# Patient Record
Sex: FEMALE | Race: BLACK OR AFRICAN AMERICAN | NOT HISPANIC OR LATINO | Employment: UNEMPLOYED | URBAN - METROPOLITAN AREA
[De-identification: names, ages, dates, MRNs, and addresses within clinical notes are randomized per-mention and may not be internally consistent; named-entity substitution may affect disease eponyms.]

---

## 2017-12-12 PROBLEM — Z86.19 H/O SYPHILIS: Chronic | Status: ACTIVE | Noted: 2017-12-12

## 2018-02-16 ENCOUNTER — HOSPITAL ENCOUNTER (EMERGENCY)
Facility: OTHER | Age: 28
Discharge: HOME | End: 2018-02-16
Attending: EMERGENCY MEDICINE
Payer: MEDICAID

## 2018-02-16 VITALS
BODY MASS INDEX: 33.99 KG/M2 | OXYGEN SATURATION: 100 % | HEART RATE: 66 BPM | DIASTOLIC BLOOD PRESSURE: 82 MMHG | TEMPERATURE: 98 F | SYSTOLIC BLOOD PRESSURE: 132 MMHG | WEIGHT: 180 LBS | RESPIRATION RATE: 16 BRPM | HEIGHT: 61 IN

## 2018-02-16 DIAGNOSIS — B37.31 CANDIDA VAGINITIS: Primary | ICD-10-CM

## 2018-02-16 DIAGNOSIS — B35.4 TINEA CORPORIS: ICD-10-CM

## 2018-02-16 LAB
B-HCG UR QL: NEGATIVE
BILIRUB UR QL STRIP: NEGATIVE
CLARITY UR: ABNORMAL
COLOR UR: YELLOW
CTP QC/QA: YES
GLUCOSE UR QL STRIP: NEGATIVE
HGB UR QL STRIP: NEGATIVE
KETONES UR QL STRIP: NEGATIVE
LEUKOCYTE ESTERASE UR QL STRIP: NEGATIVE
NITRITE UR QL STRIP: NEGATIVE
PH UR STRIP: 6 [PH] (ref 5–8)
PROT UR QL STRIP: NEGATIVE
SP GR UR STRIP: >=1.03 (ref 1–1.03)
URN SPEC COLLECT METH UR: ABNORMAL
UROBILINOGEN UR STRIP-ACNC: 1 EU/DL

## 2018-02-16 PROCEDURE — 81025 URINE PREGNANCY TEST: CPT | Performed by: EMERGENCY MEDICINE

## 2018-02-16 PROCEDURE — 99283 EMERGENCY DEPT VISIT LOW MDM: CPT | Mod: 25

## 2018-02-16 PROCEDURE — 81003 URINALYSIS AUTO W/O SCOPE: CPT

## 2018-02-16 PROCEDURE — 25000003 PHARM REV CODE 250: Performed by: PHYSICIAN ASSISTANT

## 2018-02-16 RX ORDER — FLUCONAZOLE 150 MG/1
150 TABLET ORAL ONCE
Status: COMPLETED | OUTPATIENT
Start: 2018-02-16 | End: 2018-02-16

## 2018-02-16 RX ORDER — FLUCONAZOLE 150 MG/1
150 TABLET ORAL DAILY
Qty: 1 TABLET | Refills: 0 | Status: SHIPPED | OUTPATIENT
Start: 2018-02-23 | End: 2018-02-24

## 2018-02-16 RX ORDER — FLUCONAZOLE 150 MG/1
150 TABLET ORAL DAILY
Qty: 1 TABLET | Refills: 0 | Status: SHIPPED | OUTPATIENT
Start: 2018-02-23 | End: 2018-02-16

## 2018-02-16 RX ORDER — KETOCONAZOLE 20 MG/G
CREAM TOPICAL DAILY
Qty: 15 G | Refills: 0 | Status: SHIPPED | OUTPATIENT
Start: 2018-02-16

## 2018-02-16 RX ADMIN — FLUCONAZOLE 150 MG: 150 TABLET ORAL at 09:02

## 2018-02-17 NOTE — ED PROVIDER NOTES
"Encounter Date: 2/16/2018       History     Chief Complaint   Patient presents with    Vaginitis     Patient c/o having a yeast infection for 1 week.  Patient took Monistat at home with no relief.  Patient c/o a rash to bilateral arms and legs.  Patient c/o right hand pain, non traumatic.      Patient is a 28-year-old female with no pertinent past medical history who presents to the ED with multiple complaints.  Patient reports a rash to bilateral upper and lower extremities. Patient states she first noticed the rash on her right upper arm.  She states that then spread diffusely to her left upper arm into her bilateral legs.  She states it is pruritic.  She denies any new lotions, medications, or new environmental exposure.  Patient also reports vaginal discharge for over a week.  She states she experienced this before when she had a yeast infection.  She states one week ago she took Monistat and her symptoms went away.  She states her symptoms returned about 4 days ago.  She reports she has white discharge that "looks like mashed potatoes".  She denies fever or chills.  She denies at all pain, nausea, or vomiting.      The history is provided by the patient.     Review of patient's allergies indicates:  No Known Allergies  History reviewed. No pertinent past medical history.  History reviewed. No pertinent surgical history.  Family History   Problem Relation Age of Onset    Breast cancer Neg Hx     Colon cancer Neg Hx     Ovarian cancer Neg Hx      Social History   Substance Use Topics    Smoking status: Former Smoker    Smokeless tobacco: Not on file    Alcohol use No     Review of Systems   Constitutional: Negative for chills and fever.   HENT: Negative for congestion and sore throat.    Eyes: Negative for pain.   Respiratory: Negative for shortness of breath.    Cardiovascular: Negative for chest pain.   Gastrointestinal: Negative for abdominal pain, diarrhea, nausea and vomiting.   Genitourinary: " Positive for vaginal discharge. Negative for dysuria, hematuria, vaginal bleeding and vaginal pain.   Musculoskeletal: Negative for arthralgias.   Skin: Positive for rash.   Neurological: Negative for headaches.       Physical Exam     Initial Vitals [02/16/18 1959]   BP Pulse Resp Temp SpO2   122/60 71 16 98.2 °F (36.8 °C) 97 %      MAP       80.67         Physical Exam    Constitutional: Vital signs are normal. She is cooperative.   HENT:   Head: Normocephalic and atraumatic.   Mouth/Throat: Oropharynx is clear and moist.   Eyes: Conjunctivae and EOM are normal. Pupils are equal, round, and reactive to light.   Neck: Normal range of motion. Neck supple.   Cardiovascular: Normal rate, regular rhythm and intact distal pulses.   No murmur heard.  Pulmonary/Chest: Breath sounds normal. No respiratory distress. She has no wheezes. She has no rhonchi. She has no rales.   Abdominal: Soft. Bowel sounds are normal. There is no tenderness. There is no rebound and no guarding.   Neurological: She is alert and oriented to person, place, and time. She has normal strength. GCS eye subscore is 4. GCS verbal subscore is 5. GCS motor subscore is 6.   Skin: Skin is warm and dry. Capillary refill takes less than 2 seconds.   Annular, erythematous raised areas to the extremities (more prominent in the right lower extremity (no pustules or vesicles.  No drainage.    Psychiatric: She has a normal mood and affect. Her behavior is normal.         ED Course   Procedures  Labs Reviewed   URINALYSIS - Abnormal; Notable for the following:        Result Value    Appearance, UA Hazy (*)     Specific Gravity, UA >=1.030 (*)     All other components within normal limits   POCT URINE PREGNANCY             Medical Decision Making:   Initial Assessment:   Urgent evaluation of a 28 y.o. female presenting to the emergency department complaining of vaginal discharge and a rash. Patient is afebrile, nontoxic appearing and hemodynamically stable.  ED  Management:  Patient with complaints of rash and vaginal discharge.  Rash does not look infected.  No signs of cellulitis.  No signs of SJS. Rash more consistent with a fungal, ringworm type rash.  She also has vaginal discharge that is described as a yeast infection.  Patient would like medication for this.  She denies vaginal pain, abdominal pain, nausea or vomiting.  Will treat patient with Diflucan here in the ED.  Patient will be given ketoconazole for her rash on her extremities.  Advised she follow up with her primary care provider for further evaluation and resolution of these symptoms.  She was given specific return precautions.  She is stable for discharge. I have discussed the patient with the attending thoroughly and he/she agrees to the treatment and plan.   This note was created using Dragon Medical Dictation. There may be typographical errors secondary to dictation.                       Clinical Impression:     1. Candida vaginitis    2. Tinea corporis                             Ronnell Perez PA-C  02/16/18 [id number]

## 2018-02-17 NOTE — ED TRIAGE NOTES
"Pt states "I have these rashes on my arms and legs that started about a month ago, I think its ring worms. I have this yeast infection that won't go away. I took a one day monistat and 7 day monistat and it still itches.my discharge looks like mash potatoes " denies fever/chills    "